# Patient Record
Sex: MALE | URBAN - METROPOLITAN AREA
[De-identification: names, ages, dates, MRNs, and addresses within clinical notes are randomized per-mention and may not be internally consistent; named-entity substitution may affect disease eponyms.]

---

## 2024-08-08 ENCOUNTER — OFFICE VISIT (OUTPATIENT)
Dept: URBAN - METROPOLITAN AREA CLINIC 23 | Facility: CLINIC | Age: 59
End: 2024-08-08
Payer: COMMERCIAL

## 2024-08-08 ENCOUNTER — LAB OUTSIDE AN ENCOUNTER (OUTPATIENT)
Dept: URBAN - METROPOLITAN AREA CLINIC 23 | Facility: CLINIC | Age: 59
End: 2024-08-08

## 2024-08-08 ENCOUNTER — DASHBOARD ENCOUNTERS (OUTPATIENT)
Age: 59
End: 2024-08-08

## 2024-08-08 VITALS
TEMPERATURE: 97.6 F | HEART RATE: 67 BPM | HEIGHT: 69 IN | SYSTOLIC BLOOD PRESSURE: 116 MMHG | BODY MASS INDEX: 39.84 KG/M2 | WEIGHT: 269 LBS | DIASTOLIC BLOOD PRESSURE: 74 MMHG

## 2024-08-08 DIAGNOSIS — Z12.11 COLON CANCER SCREENING: ICD-10-CM

## 2024-08-08 DIAGNOSIS — R10.84 GENERALIZED ABDOMINAL PAIN: ICD-10-CM

## 2024-08-08 DIAGNOSIS — R63.4 WEIGHT LOSS, UNINTENTIONAL: ICD-10-CM

## 2024-08-08 PROCEDURE — 99204 OFFICE O/P NEW MOD 45 MIN: CPT | Performed by: NURSE PRACTITIONER

## 2024-08-08 NOTE — EXAM-PHYSICAL EXAM
General--no acute distress, well nourished, accompanied by his wife Eyes--anicteric, clear conjunctiva, + glasses HENT--normocephalic, atraumatic head, oropharynx clear Chest--clear to auscultation anteriorly, unlabored breathing, equal chest rise and fall Heart--regular rate and rhythm, no lower extremity edema Abdomen--Rounded but nondistended, active bowel sounds present, soft, mild generalized tenderness to palpation, no appreciable palpable masses or hernias present Skin--no rashes, no jaundice, no pallor Neurologic--alert and appropriate

## 2024-08-08 NOTE — HPI-TODAY'S VISIT:
58 year old here for EGD and colonoscopy evaluation.   Reports abdominal pain on his sides for the last 6 months. Intermittent. Radiates to his back. Can last days. Will sometimes need to leave work due to the pain. No known triggers.  Reports intermittent diarrhea 2-3 watery bowel movements a day. No black or bloody stools.  Sleep makes the pain better.  No over the counter analgesics taken.  Started pantoprazole last month as needed per his PCP with no significant changes.  Stopped eating fried food but no improvement. Avoids dairy.  No heart burn, indigestion, epigastric burning, nausea or vomiting.  Good appetite.  Reports 30 lb weight loss over the last 6 months.  [place] labs by PCP in June 2024 were normal.  No history of colonoscopy.   No family history of GI disease or malignancy.   No abdominal surgeries. No cardiology history.  History of COPD. No MARGIE.

## 2024-08-22 ENCOUNTER — LAB OUTSIDE AN ENCOUNTER (OUTPATIENT)
Dept: URBAN - METROPOLITAN AREA CLINIC 23 | Facility: CLINIC | Age: 59
End: 2024-08-22

## 2024-10-03 ENCOUNTER — CLAIMS CREATED FROM THE CLAIM WINDOW (OUTPATIENT)
Dept: URBAN - METROPOLITAN AREA CLINIC 4 | Facility: CLINIC | Age: 59
End: 2024-10-03
Payer: COMMERCIAL

## 2024-10-03 ENCOUNTER — OFFICE VISIT (OUTPATIENT)
Dept: URBAN - METROPOLITAN AREA SURGERY CENTER 15 | Facility: SURGERY CENTER | Age: 59
End: 2024-10-03
Payer: COMMERCIAL

## 2024-10-03 DIAGNOSIS — D12.2 ADENOMA OF ASCENDING COLON: ICD-10-CM

## 2024-10-03 DIAGNOSIS — K29.60 OTHER GASTRITIS WITHOUT BLEEDING: ICD-10-CM

## 2024-10-03 DIAGNOSIS — K63.5 COLON POLYPS: ICD-10-CM

## 2024-10-03 DIAGNOSIS — B96.81 BACTERIAL INFECTION DUE TO H. PYLORI: ICD-10-CM

## 2024-10-03 DIAGNOSIS — D12.8 ADENOMATOUS POLYP OF RECTUM: ICD-10-CM

## 2024-10-03 DIAGNOSIS — D12.0 ADENOMA OF CECUM: ICD-10-CM

## 2024-10-03 DIAGNOSIS — D12.4 ADENOMA OF DESCENDING COLON: ICD-10-CM

## 2024-10-03 DIAGNOSIS — Z12.11 COLON CANCER SCREENING: ICD-10-CM

## 2024-10-03 DIAGNOSIS — K29.70 GASTRITIS, UNSPECIFIED, WITHOUT BLEEDING: ICD-10-CM

## 2024-10-03 DIAGNOSIS — K31.89 OTHER DISEASES OF STOMACH AND DUODENUM: ICD-10-CM

## 2024-10-03 DIAGNOSIS — B96.81 HELICOBACTER PYLORI [H. PYLORI] AS THE CAUSE OF DISEASES CLASSIFIED ELSEWHERE: ICD-10-CM

## 2024-10-03 PROCEDURE — 43239 EGD BIOPSY SINGLE/MULTIPLE: CPT | Performed by: INTERNAL MEDICINE

## 2024-10-03 PROCEDURE — 45380 COLONOSCOPY AND BIOPSY: CPT | Performed by: INTERNAL MEDICINE

## 2024-10-03 PROCEDURE — 00813 ANES UPR LWR GI NDSC PX: CPT | Performed by: NURSE ANESTHETIST, CERTIFIED REGISTERED

## 2024-10-03 PROCEDURE — 45385 COLONOSCOPY W/LESION REMOVAL: CPT | Performed by: INTERNAL MEDICINE

## 2024-10-03 PROCEDURE — 88305 TISSUE EXAM BY PATHOLOGIST: CPT | Performed by: PATHOLOGY

## 2024-10-03 PROCEDURE — 88342 IMHCHEM/IMCYTCHM 1ST ANTB: CPT | Performed by: PATHOLOGY

## 2024-10-13 ENCOUNTER — TELEPHONE ENCOUNTER (OUTPATIENT)
Dept: URBAN - METROPOLITAN AREA CLINIC 23 | Facility: CLINIC | Age: 59
End: 2024-10-13

## 2024-10-13 RX ORDER — CLARITHROMYCIN 500 MG/1
1 TABLET TABLET, FILM COATED ORAL
Qty: 28 TABLET | OUTPATIENT
Start: 2024-10-13 | End: 2024-10-27

## 2024-10-13 RX ORDER — PANTOPRAZOLE SODIUM 20 MG/1
1 TABLET TABLET, DELAYED RELEASE ORAL TWICE A DAY
Qty: 28 TABLET | Refills: 0 | OUTPATIENT
Start: 2024-10-14

## 2024-10-13 RX ORDER — AMOXICILLIN 500 MG/1
2 CAPSULES CAPSULE ORAL
Qty: 56 CAPSULE | OUTPATIENT
Start: 2024-10-13 | End: 2024-10-27

## 2024-11-01 ENCOUNTER — OFFICE VISIT (OUTPATIENT)
Dept: URBAN - METROPOLITAN AREA CLINIC 23 | Facility: CLINIC | Age: 59
End: 2024-11-01
Payer: COMMERCIAL

## 2024-11-01 VITALS
HEIGHT: 69 IN | TEMPERATURE: 98.1 F | HEART RATE: 89 BPM | BODY MASS INDEX: 40.14 KG/M2 | SYSTOLIC BLOOD PRESSURE: 118 MMHG | WEIGHT: 271 LBS | DIASTOLIC BLOOD PRESSURE: 76 MMHG

## 2024-11-01 DIAGNOSIS — A04.8 H. PYLORI INFECTION: ICD-10-CM

## 2024-11-01 PROCEDURE — 99213 OFFICE O/P EST LOW 20 MIN: CPT

## 2024-11-01 RX ORDER — PANTOPRAZOLE SODIUM 20 MG/1
1 TABLET TABLET, DELAYED RELEASE ORAL TWICE A DAY
Qty: 28 TABLET | Refills: 0 | Status: ACTIVE | COMMUNITY
Start: 2024-10-14

## 2024-11-01 RX ORDER — AMOXICILLIN 500 MG/1
1 CAPSULE CAPSULE ORAL
Status: ACTIVE | COMMUNITY

## 2024-11-01 NOTE — HPI-TODAY'S VISIT:
58-year-old male here for positive H. pylori.  He was seen in clinic 08/08/2024 by Malka AYALA for for EGD and colonoscopy evaluation.  At that time he reported unintentional weight loss and generalized abdominal pain. Routine labs by PCP in June 2024 were normal. CT ordered due to unintentional weight loss, unsure if this was ever done.  EGD showed H. pylori.  Started on triple therapy 10/13/2024. x 2 weeks.   He is here with his wife today.  He states that he completed course of triple therapy October 27.  He is not currently taking PPI.  Abdominal pain has improved.  His wife states that they have seen Dr. Lemus and was told that the adrenal lesion looks benign however she does not know for sure unless it is removed.  She recommend cardiac and pulmonary evaluation.  They are in the process of getting this.  EGD 10/2024 (Dr. Gao)-, normal duodenum, Z-line regular, small hiatal hernia, chronic gastritis.  Helicobacter gastritis.  No evidence of intestinal metaplasia, dysplasia or malignancy.  Colonoscopy 10/2024 (Dr. Gao) terminal ileum normal, TA polyp in cecum, 2 TA polyps in ascending colon, 1 TA polyp in descending colon, small TA biopsy showed chronic polyp in rectum, sigmoid diverticulosis and internal hemorrhoids.  Repeat colonoscopy due in 3 years. 10/2027.

## 2024-11-25 ENCOUNTER — LAB OUTSIDE AN ENCOUNTER (OUTPATIENT)
Dept: URBAN - METROPOLITAN AREA CLINIC 23 | Facility: CLINIC | Age: 59
End: 2024-11-25